# Patient Record
Sex: FEMALE | Race: WHITE | Employment: OTHER | URBAN - METROPOLITAN AREA
[De-identification: names, ages, dates, MRNs, and addresses within clinical notes are randomized per-mention and may not be internally consistent; named-entity substitution may affect disease eponyms.]

---

## 2021-11-03 ENCOUNTER — HOSPITAL ENCOUNTER (OUTPATIENT)
Dept: CARDIAC REHAB | Age: 81
Setting detail: THERAPIES SERIES
Discharge: HOME | End: 2021-11-03
Payer: MEDICARE

## 2021-11-03 PROCEDURE — 93798 PHYS/QHP OP CAR RHAB W/ECG: CPT

## 2021-11-05 ENCOUNTER — HOSPITAL ENCOUNTER (OUTPATIENT)
Dept: CARDIAC REHAB | Age: 81
Setting detail: THERAPIES SERIES
Discharge: HOME | End: 2021-11-05
Payer: MEDICARE

## 2021-11-05 PROCEDURE — 93798 PHYS/QHP OP CAR RHAB W/ECG: CPT

## 2021-11-08 ENCOUNTER — HOSPITAL ENCOUNTER (OUTPATIENT)
Dept: CARDIAC REHAB | Age: 81
Setting detail: THERAPIES SERIES
Discharge: HOME | End: 2021-11-08
Payer: MEDICARE

## 2021-11-08 PROCEDURE — 93798 PHYS/QHP OP CAR RHAB W/ECG: CPT

## 2021-11-10 ENCOUNTER — HOSPITAL ENCOUNTER (OUTPATIENT)
Dept: CARDIAC REHAB | Age: 81
Setting detail: THERAPIES SERIES
Discharge: HOME | End: 2021-11-10
Payer: MEDICARE

## 2021-11-10 PROCEDURE — 93798 PHYS/QHP OP CAR RHAB W/ECG: CPT

## 2021-11-12 ENCOUNTER — HOSPITAL ENCOUNTER (OUTPATIENT)
Dept: CARDIAC REHAB | Age: 81
Setting detail: THERAPIES SERIES
Discharge: HOME | End: 2021-11-12
Payer: MEDICARE

## 2021-11-12 PROCEDURE — 93798 PHYS/QHP OP CAR RHAB W/ECG: CPT

## 2021-11-15 ENCOUNTER — HOSPITAL ENCOUNTER (OUTPATIENT)
Dept: CARDIAC REHAB | Age: 81
Setting detail: THERAPIES SERIES
Discharge: HOME | End: 2021-11-15
Payer: MEDICARE

## 2021-11-15 PROCEDURE — 93798 PHYS/QHP OP CAR RHAB W/ECG: CPT

## 2021-11-17 ENCOUNTER — HOSPITAL ENCOUNTER (OUTPATIENT)
Dept: CARDIAC REHAB | Age: 81
Setting detail: THERAPIES SERIES
Discharge: HOME | End: 2021-11-17
Payer: MEDICARE

## 2021-11-17 PROCEDURE — 93798 PHYS/QHP OP CAR RHAB W/ECG: CPT

## 2021-11-19 ENCOUNTER — HOSPITAL ENCOUNTER (OUTPATIENT)
Dept: CARDIAC REHAB | Age: 81
Setting detail: THERAPIES SERIES
Discharge: HOME | End: 2021-11-19
Payer: MEDICARE

## 2021-11-19 PROCEDURE — 93798 PHYS/QHP OP CAR RHAB W/ECG: CPT

## 2021-11-22 ENCOUNTER — HOSPITAL ENCOUNTER (OUTPATIENT)
Dept: CARDIAC REHAB | Age: 81
Setting detail: THERAPIES SERIES
Discharge: HOME | End: 2021-11-22
Payer: MEDICARE

## 2021-11-22 PROCEDURE — 93798 PHYS/QHP OP CAR RHAB W/ECG: CPT

## 2021-11-29 ENCOUNTER — HOSPITAL ENCOUNTER (OUTPATIENT)
Dept: CARDIAC REHAB | Age: 81
Setting detail: THERAPIES SERIES
Discharge: HOME | End: 2021-11-29
Payer: MEDICARE

## 2021-11-29 PROCEDURE — 93798 PHYS/QHP OP CAR RHAB W/ECG: CPT

## 2021-12-01 ENCOUNTER — HOSPITAL ENCOUNTER (OUTPATIENT)
Dept: CARDIAC REHAB | Age: 81
Setting detail: THERAPIES SERIES
Discharge: HOME | End: 2021-12-01
Payer: MEDICARE

## 2021-12-01 PROCEDURE — 93798 PHYS/QHP OP CAR RHAB W/ECG: CPT

## 2021-12-06 ENCOUNTER — HOSPITAL ENCOUNTER (OUTPATIENT)
Dept: CARDIAC REHAB | Age: 81
Setting detail: THERAPIES SERIES
Discharge: HOME | End: 2021-12-06
Payer: MEDICARE

## 2021-12-06 PROCEDURE — 93798 PHYS/QHP OP CAR RHAB W/ECG: CPT

## 2021-12-10 ENCOUNTER — HOSPITAL ENCOUNTER (OUTPATIENT)
Dept: CARDIAC REHAB | Age: 81
Setting detail: THERAPIES SERIES
Discharge: HOME | End: 2021-12-10
Payer: MEDICARE

## 2021-12-10 PROCEDURE — 93798 PHYS/QHP OP CAR RHAB W/ECG: CPT

## 2022-01-03 ENCOUNTER — HOSPITAL ENCOUNTER (OUTPATIENT)
Dept: CARDIAC REHAB | Age: 82
Discharge: HOME | End: 2022-01-03

## 2022-01-03 NOTE — PROGRESS NOTES
Nutrition Education    One on one diet education pertaining to cardiac and renal restrictions reviewed. The following handouts were used as references: Plant Based Foods, The Nutrition [place], Heart Healthy Fats, Eat Right with Less Salt, Plan your Portions. Reviewed high potassium food sources and ways to accommodate. Websites specific to renal diet (recipes) offered. Discussion on how to incorporate both cardiac and renal recommendations simultaneously. Sample day menu specific to cardiac and renal restrictions completed. Patient verbalized understanding, asked many appropriate questions. All questions/concerns were addressed.     Duration: 30 minutes